# Patient Record
Sex: FEMALE | Race: OTHER | HISPANIC OR LATINO | Employment: FULL TIME | URBAN - METROPOLITAN AREA
[De-identification: names, ages, dates, MRNs, and addresses within clinical notes are randomized per-mention and may not be internally consistent; named-entity substitution may affect disease eponyms.]

---

## 2021-07-24 ENCOUNTER — HOSPITAL ENCOUNTER (EMERGENCY)
Facility: HOSPITAL | Age: 36
Discharge: HOME | End: 2021-07-24
Attending: EMERGENCY MEDICINE | Admitting: EMERGENCY MEDICINE
Payer: COMMERCIAL

## 2021-07-24 VITALS
OXYGEN SATURATION: 100 % | BODY MASS INDEX: 37.41 KG/M2 | RESPIRATION RATE: 16 BRPM | SYSTOLIC BLOOD PRESSURE: 126 MMHG | TEMPERATURE: 98.4 F | HEIGHT: 64 IN | DIASTOLIC BLOOD PRESSURE: 66 MMHG | WEIGHT: 219.14 LBS | HEART RATE: 65 BPM

## 2021-07-24 DIAGNOSIS — J02.9 SORE THROAT: Primary | ICD-10-CM

## 2021-07-24 DIAGNOSIS — R51.9 HEADACHE: ICD-10-CM

## 2021-07-24 LAB — SARS-COV-2 RNA RESP QL NAA+PROBE: NEGATIVE

## 2021-07-24 PROCEDURE — 99284 EMERGENCY DEPT VISIT MOD MDM: CPT | Performed by: EMERGENCY MEDICINE

## 2021-07-24 PROCEDURE — 87635 SARS-COV-2 COVID-19 AMP PRB: CPT | Performed by: EMERGENCY MEDICINE

## 2021-07-24 PROCEDURE — 99283 EMERGENCY DEPT VISIT LOW MDM: CPT

## 2021-07-24 RX ORDER — NAPROXEN 250 MG/1
250 TABLET ORAL 2 TIMES DAILY WITH MEALS
Qty: 10 TABLET | Refills: 0 | Status: SHIPPED | OUTPATIENT
Start: 2021-07-24 | End: 2021-07-29

## 2021-07-24 RX ORDER — NAPROXEN 250 MG/1
250 TABLET ORAL ONCE
Status: COMPLETED | OUTPATIENT
Start: 2021-07-24 | End: 2021-07-24

## 2021-07-24 RX ORDER — DEXAMETHASONE 4 MG/1
10 TABLET ORAL ONCE
Status: COMPLETED | OUTPATIENT
Start: 2021-07-24 | End: 2021-07-24

## 2021-07-24 RX ADMIN — DEXAMETHASONE 10 MG: 4 TABLET ORAL at 09:25

## 2021-07-24 RX ADMIN — NAPROXEN 250 MG: 250 TABLET ORAL at 09:25

## 2021-07-24 NOTE — DISCHARGE INSTRUCTIONS
Take naproxen twice daily as needed for sore throat  Follow-up with primary care provider  Come back to emergency department if you are unable to swallow solids, have a change in voice, swelling in the back in throat, new or worsening headache, neck stiffness  He will be called with results of your covid testing  If negative you may return to work tomorrow  If positive he must quarantine home for 10 days from the start her symptoms

## 2021-07-24 NOTE — ED PROVIDER NOTES
History  Chief Complaint   Patient presents with    Sore Throat     Pt presents to ED from home w/ sore throat and congestion starting yesterday  74-year-old female without previous medical history presents with cough, sore throat, mild headache  Mild headache started 3 days ago  Located frontally  Goes away with Tylenol  No fevers or neck stiffness  No red flags  Patient notes a sore throat with dysphagia to solids  No change in voice  Patient is immunized against COVID-19  Received the Moderna vaccine in May  Sore Throat  Location:  Generalized  Quality:  Aching  Severity:  Mild  Onset quality:  Gradual  Associated symptoms: cough    Cough:     Cough characteristics:  Non-productive    Sputum characteristics:  Unable to specify    Severity:  Mild    Onset quality:  Gradual    Timing:  Constant  Cough  Cough characteristics:  Non-productive  Sputum characteristics:  Unable to specify  Severity:  Mild  Onset quality:  Gradual  Timing:  Constant  Progression:  Unchanged  Chronicity:  New  Associated symptoms: sore throat        None       History reviewed  No pertinent past medical history  History reviewed  No pertinent surgical history  History reviewed  No pertinent family history  I have reviewed and agree with the history as documented  E-Cigarette/Vaping    E-Cigarette Use Never User      E-Cigarette/Vaping Substances     Social History     Tobacco Use    Smoking status: Never Smoker   Vaping Use    Vaping Use: Never used   Substance Use Topics    Alcohol use: Not Currently    Drug use: Not Currently       Review of Systems   HENT: Positive for sore throat  Respiratory: Positive for cough  All other systems reviewed and are negative  Physical Exam  Physical Exam  Vitals and nursing note reviewed  Constitutional:       General: She is not in acute distress  Appearance: Normal appearance  She is not ill-appearing     HENT:      Head: Normocephalic and atraumatic  Comments: Midline uvula  No erythema the back of throat  No swelling  Tonsils normal   No erythema or discharge  No masses palpated in the neck  No lymphadenopathy  Right Ear: External ear normal       Left Ear: External ear normal       Nose: Nose normal       Mouth/Throat:      Mouth: Mucous membranes are moist    Eyes:      General:         Right eye: No discharge  Left eye: No discharge  Conjunctiva/sclera: Conjunctivae normal    Cardiovascular:      Rate and Rhythm: Normal rate and regular rhythm  Pulses: Normal pulses  Heart sounds: No murmur heard  Pulmonary:      Effort: Pulmonary effort is normal       Breath sounds: Normal breath sounds  Abdominal:      General: Abdomen is flat  There is no distension  Tenderness: There is no abdominal tenderness  Musculoskeletal:         General: Normal range of motion  Cervical back: Normal range of motion  Skin:     General: Skin is warm  Capillary Refill: Capillary refill takes less than 2 seconds  Findings: No rash  Neurological:      General: No focal deficit present  Mental Status: She is alert  Mental status is at baseline     Psychiatric:         Mood and Affect: Mood normal          Behavior: Behavior normal          Vital Signs  ED Triage Vitals   Temperature Pulse Respirations Blood Pressure SpO2   07/24/21 0853 07/24/21 0853 07/24/21 0853 07/24/21 0853 07/24/21 0856   98 4 °F (36 9 °C) 65 16 126/66 100 %      Temp src Heart Rate Source Patient Position - Orthostatic VS BP Location FiO2 (%)   -- -- -- -- --             Pain Score       07/24/21 0925       5           Vitals:    07/24/21 0853   BP: 126/66   Pulse: 65         Visual Acuity      ED Medications  Medications   dexamethasone (DECADRON) tablet 10 mg (10 mg Oral Given 7/24/21 0925)   naproxen (NAPROSYN) tablet 250 mg (250 mg Oral Given 7/24/21 9526)       Diagnostic Studies  Results Reviewed     Procedure Component Value Units Date/Time    Novel Coronavirus [facility] [[id number]]  (Normal) Collected: 07/24/21 0921    Lab Status: Final result Specimen: Nares from Nose Updated: 07/24/21 1026     SARS-CoV-2 Negative    Narrative: The specimen collection materials, transport medium, and/or testing methodology utilized in the production of these test results have been proven to be reliable in a limited validation with an abbreviated program under the Emergency Utilization Authorization provided by the FDA  Testing reported as "Presumptive positive" will be confirmed with secondary testing to ensure result accuracy  Clinical caution and judgement should be used with the interpretation of these results with consideration of the clinical impression and other laboratory testing  Testing reported as "Positive" or "Negative" has been proven to be accurate according to standard laboratory validation requirements  All testing is performed with control materials showing appropriate reactivity at standard intervals  No orders to display              Procedures  Procedures         ED Course                             SBIRT 22yo+      Most Recent Value   SBIRT (22 yo +)   In order to provide better care to our patients, we are screening all of our patients for alcohol and drug use  Would it be okay to ask you these screening questions? No Filed at: 07/24/2021 0859                    MDM  Number of Diagnoses or Management Options  Headache: new and requires workup  Sore throat: new and requires workup  Diagnosis management comments: 72-year-old female presents with URI like symptoms  No concern for deep space infection of the neck, peritonsillar abscess or other acute back to infection of the oropharynx  Headache with no red flags  Mild headache  Will give a dosage of steroids and placed on an NSAID  Will test for COVID-19  Patient asked to be called with results of this      11:19 AM  Called patient with results of covid testing  Left message on voice mail  Amount and/or Complexity of Data Reviewed  Clinical lab tests: ordered and reviewed    Risk of Complications, Morbidity, and/or Mortality  Presenting problems: low  Diagnostic procedures: low  Management options: low        Disposition  Final diagnoses:   Sore throat   Headache     Time reflects when diagnosis was documented in both MDM as applicable and the Disposition within this note     Time User Action Codes Description Comment    7/24/2021  9:20 AM Steve Coleman Add [J02 9] Sore throat     7/24/2021  9:20 AM Steve Coleman Add [R51 9] Headache       ED Disposition     ED Disposition Condition Date/Time Comment    Discharge Stable Sat Jul 24, 2021  9:20 AM Madalyn Ferrara discharge to home/self care  Follow-up Information     Follow up With Specialties Details Why Contact Info Additional [id number]  Emergency Department Emergency Medicine  If symptoms worsen [id number] [facility],[address] [id number]  [facility], [address], [address]          Discharge Medication List as of 7/24/2021  9:25 AM      START taking these medications    Details   naproxen (NAPROSYN) 250 mg tablet Take 1 tablet (250 mg total) by mouth 2 (two) times a day with meals for 5 days, Starting Sat 7/24/2021, Until Thu 7/29/2021, Print           No discharge procedures on file      PDMP Review     None          ED Provider  Electronically Signed by           Brook Flanagan DO  07/24/21 1120

## 2021-07-24 NOTE — Clinical Note
Radha Davila was seen and treated in our emergency department on 7/24/2021  Diagnosis: sore throat    Madalyn  may return to work on return date  She may return on this date: 08/22/2021    If COVID testing is negative, may return to work immediately  If positive, must quarantine at home for 10 days from the start of symptoms  If you have any questions or concerns, please don't hesitate to call        Machelle Lopez DO    ______________________________           _______________          _______________  Hospital Representative                              Date                                Time

## 2022-01-13 DIAGNOSIS — Z11.59 SCREENING FOR VIRAL DISEASE: Primary | ICD-10-CM

## 2022-01-13 PROCEDURE — U0005 INFEC AGEN DETEC AMPLI PROBE: HCPCS | Performed by: FAMILY MEDICINE

## 2022-01-13 PROCEDURE — U0003 INFECTIOUS AGENT DETECTION BY NUCLEIC ACID (DNA OR RNA); SEVERE ACUTE RESPIRATORY SYNDROME CORONAVIRUS 2 (SARS-COV-2) (CORONAVIRUS DISEASE [COVID-19]), AMPLIFIED PROBE TECHNIQUE, MAKING USE OF HIGH THROUGHPUT TECHNOLOGIES AS DESCRIBED BY CMS-2020-01-R: HCPCS | Performed by: FAMILY MEDICINE
